# Patient Record
Sex: MALE | Race: OTHER | HISPANIC OR LATINO | URBAN - METROPOLITAN AREA
[De-identification: names, ages, dates, MRNs, and addresses within clinical notes are randomized per-mention and may not be internally consistent; named-entity substitution may affect disease eponyms.]

---

## 2022-10-15 ENCOUNTER — EMERGENCY (EMERGENCY)
Facility: HOSPITAL | Age: 57
LOS: 1 days | Discharge: HOME | End: 2022-10-15
Attending: STUDENT IN AN ORGANIZED HEALTH CARE EDUCATION/TRAINING PROGRAM
Payer: COMMERCIAL

## 2022-10-15 VITALS
HEIGHT: 64.96 IN | RESPIRATION RATE: 18 BRPM | OXYGEN SATURATION: 96 % | TEMPERATURE: 98 F | WEIGHT: 179.9 LBS | HEART RATE: 64 BPM | DIASTOLIC BLOOD PRESSURE: 84 MMHG | SYSTOLIC BLOOD PRESSURE: 143 MMHG

## 2022-10-15 PROCEDURE — 93005 ELECTROCARDIOGRAM TRACING: CPT

## 2022-10-15 PROCEDURE — 73120 X-RAY EXAM OF HAND: CPT | Mod: 26,LT

## 2022-10-15 PROCEDURE — 93010 ELECTROCARDIOGRAM REPORT: CPT

## 2022-10-15 PROCEDURE — 73110 X-RAY EXAM OF WRIST: CPT | Mod: 26,LT

## 2022-10-15 PROCEDURE — 73110 X-RAY EXAM OF WRIST: CPT

## 2022-10-15 PROCEDURE — 73120 X-RAY EXAM OF HAND: CPT

## 2022-10-15 PROCEDURE — 99284 EMERGENCY DEPT VISIT MOD MDM: CPT

## 2022-10-15 PROCEDURE — 99284 EMERGENCY DEPT VISIT MOD MDM: CPT | Mod: 25

## 2022-10-15 RX ORDER — DIAZEPAM 5 MG
1 TABLET ORAL
Qty: 9 | Refills: 0
Start: 2022-10-15 | End: 2022-10-17

## 2022-10-15 RX ORDER — LIDOCAINE 4 G/100G
1 CREAM TOPICAL ONCE
Refills: 0 | Status: COMPLETED | OUTPATIENT
Start: 2022-10-15 | End: 2022-10-15

## 2022-10-15 RX ORDER — IBUPROFEN 200 MG
600 TABLET ORAL ONCE
Refills: 0 | Status: COMPLETED | OUTPATIENT
Start: 2022-10-15 | End: 2022-10-15

## 2022-10-15 RX ORDER — DIAZEPAM 5 MG
10 TABLET ORAL ONCE
Refills: 0 | Status: DISCONTINUED | OUTPATIENT
Start: 2022-10-15 | End: 2022-10-15

## 2022-10-15 RX ORDER — IBUPROFEN 200 MG
1 TABLET ORAL
Qty: 56 | Refills: 0
Start: 2022-10-15 | End: 2022-10-28

## 2022-10-15 RX ADMIN — Medication 600 MILLIGRAM(S): at 12:01

## 2022-10-15 RX ADMIN — Medication 600 MILLIGRAM(S): at 13:00

## 2022-10-15 RX ADMIN — Medication 10 MILLIGRAM(S): at 12:01

## 2022-10-15 RX ADMIN — LIDOCAINE 1 PATCH: 4 CREAM TOPICAL at 12:01

## 2022-10-15 NOTE — ED PROVIDER NOTE - OBJECTIVE STATEMENT
58yo M pmh nephrolithiasis presenting to ED with 2 wks of L hand and arm pain/cramping after sustaining hand trauma at work. Pt is , was using a tool when his hand slipped and he slammed it on a metal bar. Since then has difficulty gripping items and as of the past week began developing L shoulder cramping radiating down the arm. No loss of sensation, no recent falls, denies any cuts or further injury.

## 2022-10-15 NOTE — ED PROVIDER NOTE - PHYSICAL EXAMINATION
GENERAL: non-toxic appearing, alert, in NAD  HEENT: atraumatic, normocephalic, Vision grossly intact, no conjunctivitis or discharge, hearing grossly intact,  no nasal discharge, epistaxis   CARDIAC: RRR, normal S1S2,  no appreciable murmurs, no cyanosis, cap refill < 2 seconds  PULM: normal work of breathing, oxygen saturation on RA wnl, CTAB, no crackles, rales, rhonchi, or wheezing  GI: abdomen nondistended, soft, nontender, no guarding or rebound tenderness, no palpable masses  NEURO: awake and alert, follows commands, normal speech, PERRLA, EOMI, no focal motor or sensory deficits  MSK: spine appears normal, no joint swelling or erythema, +tenderness of L scapula with no gross deformities, ranging all extremities with no appreciable loss of ROM  LUE, normal muscle bulk, intact thumb opposition and fine motor movement, no thenar wasting, sensation intact, good cap refil, normal  strength, FROM at wrist and elbow.   EXT: no peripheral edema, calf tenderness, redness or swelling  SKIN: warm, dry, and intact, no rashes  PSYCH: appropriate mood and affect

## 2022-10-15 NOTE — ED PROVIDER NOTE - NSFOLLOWUPINSTRUCTIONS_ED_ALL_ED_FT
You were seen today for hand and wrist pain.    Your back and shoulder muscle spasms were treated with a muscle relaxer, ibuprofen was given for antiinflammatory and pain     Take 400-600mg ibuprofen (advil or motrin) every 6-8 hours as needed, take with food    A prescription for diazepam (valium) was sent to your pharmacy. This medication is a muscle relaxer which can make you sleepy and drowsy. Please take only as needed for muscle spasms    Please follow up with orthopedics or sports medicine, their number is listed in this packet    Please return to the Emergency Department should you experience any of the following:  - New or worsening pain  - Numbness or weakness of your legs or feet  - Fever, unexplained weight loss, night sweats  - Blood in stool or in urine  - New weakness, fatigue, or fainting  - Any new concerning symptoms Le vieron hoy por dolor en la mano y la cedrick.    Los espasmos musculares de la espalda y los hombros se trataron con un relajante muscular, se administró ibuprofeno vick antiinflamatorio y analgésico.    Wood 400-600 mg de ibuprofeno (advil o motrin) cada 6-8 horas según sea necesario, tómelo con alimentos    Se envió jason receta de diazepam (valium) a jarrell farmacia. Nisreen medicamento es un relajante muscular que puede causarle sueño y somnolencia. Wood solo según sea necesario para los espasmos musculares.    Shania un seguimiento con ortopedia o medicina deportiva, jarrell número se encuentra en nisreen paquete    Regrese al Departamento de Emergencias si experimenta alguno de los siguientes:  - Dolor nuevo o que empeora  - Entumecimiento o debilidad de las piernas o los pies  - Fiebre, pérdida de peso inexplicable, sudores nocturnos  - Constantine en las heces o en la orina  - Nueva debilidad, fatiga o desmayo  - Cualquier nuevo síntoma preocupante    ----------------------------------------------------------------------------------------------------------  You were seen today for hand and wrist pain.    Your back and shoulder muscle spasms were treated with a muscle relaxer, ibuprofen was given for antiinflammatory and pain     Take 400-600mg ibuprofen (advil or motrin) every 6-8 hours as needed, take with food    A prescription for diazepam (valium) was sent to your pharmacy. This medication is a muscle relaxer which can make you sleepy and drowsy. Please take only as needed for muscle spasms    Please follow up with orthopedics or sports medicine, their number is listed in this packet    Please return to the Emergency Department should you experience any of the following:  - New or worsening pain  - Numbness or weakness of your legs or feet  - Fever, unexplained weight loss, night sweats  - Blood in stool or in urine  - New weakness, fatigue, or fainting  - Any new concerning symptoms

## 2022-10-15 NOTE — ED ADULT NURSE NOTE - LOCATION
Care Due:                  Date            Visit Type   Department     Provider  --------------------------------------------------------------------------------                                ESTABLISHED                              PATIENT      [facility] FAMILY  Last Visit: 11-      [facility]       OMAR SANTOS  Next Visit: None Scheduled  None         None Found                                                            Last  Test          Frequency    Reason                     Performed    Due Date  --------------------------------------------------------------------------------    Office Visit  12 months..  lisinopriL, tadalafil....  11- 11-    Cr..........  12 months..  lisinopriL...............  11-   10-    K...........  12 months..  lisinopriL...............  11-   10-    Powered by [employer]. Reference number: [id number]. 8/14/2020 8:06:59 PM CDT   back

## 2022-10-15 NOTE — ED PROVIDER NOTE - NSFOLLOWUPCLINICS_GEN_ALL_ED_FT
[facility]  Orthopedics  [address] [place] [address]  Phone: [phone]  Fax: [phone]  Follow Up Time: 7-10 Days

## 2022-10-15 NOTE — ED ADULT TRIAGE NOTE - CHIEF COMPLAINT QUOTE
c/o pain to Left hand to arm ,shoulder and neck area x 5 days . no injury but works as a  . states pain worse on movements

## 2022-10-15 NOTE — ED PROVIDER NOTE - WR INTERPRETATION 1
MSK XR negative - No fracture, No dislocation, No foreign body, possible osteoporosis given loss of bony trabeculae and cortical thinning

## 2022-10-15 NOTE — ED PROVIDER NOTE - CLINICAL SUMMARY MEDICAL DECISION MAKING FREE TEXT BOX
58yo M with 2 wks of L hand cramping and weakness, 1 wk L shoulder cramping. Well appearing with FROM and intact sensation throughout. Tenderness over L scapula. No gross deformities  - suspect L forearm tendinopathy with concurrent muscle strain of L shoulder/upper back  - muscle relaxer, lidocaine patch, advil  - x-ray hand and wrist given recent trauma, low suspicion for fracture.

## 2022-10-15 NOTE — ED PROVIDER NOTE - PATIENT PORTAL LINK FT
You can access the FollowMyHealth Patient Portal offered by [facility] by registering at the following website: [email or web address]. By joining [employer]’s FollowMyHealth portal, you will also be able to view your health information using other applications (apps) compatible with our system.

## 2022-10-17 ENCOUNTER — EMERGENCY (EMERGENCY)
Facility: HOSPITAL | Age: 57
LOS: 1 days | Discharge: HOME | End: 2022-10-17
Attending: EMERGENCY MEDICINE
Payer: COMMERCIAL

## 2022-10-17 VITALS
HEIGHT: 64.96 IN | HEART RATE: 60 BPM | OXYGEN SATURATION: 98 % | WEIGHT: 179.9 LBS | TEMPERATURE: 98 F | RESPIRATION RATE: 18 BRPM | SYSTOLIC BLOOD PRESSURE: 128 MMHG | DIASTOLIC BLOOD PRESSURE: 85 MMHG

## 2022-10-17 PROBLEM — N20.0 CALCULUS OF KIDNEY: Chronic | Status: ACTIVE | Noted: 2022-10-15

## 2022-10-17 PROCEDURE — 73030 X-RAY EXAM OF SHOULDER: CPT | Mod: 26,LT

## 2022-10-17 PROCEDURE — G1004: CPT

## 2022-10-17 PROCEDURE — 99284 EMERGENCY DEPT VISIT MOD MDM: CPT

## 2022-10-17 PROCEDURE — 99284 EMERGENCY DEPT VISIT MOD MDM: CPT | Mod: 25

## 2022-10-17 PROCEDURE — 73030 X-RAY EXAM OF SHOULDER: CPT

## 2022-10-17 PROCEDURE — 72125 CT NECK SPINE W/O DYE: CPT | Mod: 26,ME

## 2022-10-17 PROCEDURE — 96374 THER/PROPH/DIAG INJ IV PUSH: CPT

## 2022-10-17 PROCEDURE — 96372 THER/PROPH/DIAG INJ SC/IM: CPT | Mod: XU

## 2022-10-17 PROCEDURE — 72125 CT NECK SPINE W/O DYE: CPT | Mod: ME

## 2022-10-17 RX ORDER — DIAZEPAM 5 MG
5 TABLET ORAL ONCE
Refills: 0 | Status: DISCONTINUED | OUTPATIENT
Start: 2022-10-17 | End: 2022-10-17

## 2022-10-17 RX ORDER — DEXAMETHASONE 0.5 MG/5ML
6 ELIXIR ORAL ONCE
Refills: 0 | Status: COMPLETED | OUTPATIENT
Start: 2022-10-17 | End: 2022-10-17

## 2022-10-17 RX ORDER — KETOROLAC TROMETHAMINE 30 MG/ML
30 SYRINGE (ML) INJECTION ONCE
Refills: 0 | Status: DISCONTINUED | OUTPATIENT
Start: 2022-10-17 | End: 2022-10-17

## 2022-10-17 RX ORDER — LIDOCAINE 4 G/100G
1 CREAM TOPICAL ONCE
Refills: 0 | Status: COMPLETED | OUTPATIENT
Start: 2022-10-17 | End: 2022-10-17

## 2022-10-17 RX ORDER — METHOCARBAMOL 500 MG/1
2 TABLET, FILM COATED ORAL
Qty: 30 | Refills: 0
Start: 2022-10-17 | End: 2022-10-21

## 2022-10-17 RX ADMIN — Medication 5 MILLIGRAM(S): at 16:04

## 2022-10-17 RX ADMIN — Medication 30 MILLIGRAM(S): at 16:40

## 2022-10-17 RX ADMIN — Medication 6 MILLIGRAM(S): at 16:03

## 2022-10-17 RX ADMIN — Medication 30 MILLIGRAM(S): at 16:04

## 2022-10-17 RX ADMIN — LIDOCAINE 1 PATCH: 4 CREAM TOPICAL at 16:03

## 2022-10-17 NOTE — ED ADULT NURSE NOTE - OBJECTIVE STATEMENT
pt  is a 56 y/o  male with c/o  lt. upper  back pain radiating  to the rt  arm.   w/  equal chest  expansion with  clear breath  sound  pt  denies any chest pain and no signs of any  shortness of breath.

## 2022-10-17 NOTE — ED PROVIDER NOTE - NS ED ROS FT
Pt denies fevers, chills  nausea, vomiting,   chest pain, palpitations  shortness of breath, orthopnea  abdominal pain, melena,   dysuria, hematuria   saddle anesthesia   rash  enlarged lymph nodes

## 2022-10-17 NOTE — ED PROVIDER NOTE - OBJECTIVE STATEMENT
57-year-old male with left shoulder pain left wrist and hand pain.  Patient states he has a decreased  and numbness in the medial aspect of his hand.  He denies any recent trauma.  He came here a few days ago had x-rays done but has feels no relief since that time.  He does feel electric shooting pain down his arm.  He denies any trauma to his neck.  He denies any urinary incontinence saddle anesthesia leg pain leg weakness.

## 2022-10-17 NOTE — ED PROVIDER NOTE - PATIENT PORTAL LINK FT
You can access the FollowMyHealth Patient Portal offered by [facility] by registering at the following website: [email or web address]. By joining [employer]’s FollowMyHealth portal, you will also be able to view your health information using other applications (apps) compatible with our system.

## 2022-10-17 NOTE — ED PROVIDER NOTE - NSFOLLOWUPINSTRUCTIONS_ED_ALL_ED_FT
Radiculopatía cervical    Cervical Radiculopathy    Close-up of the nerves of the cervical spine.   La radiculopatía cervical se presenta cuando un nervio del roel (un nervio cervical) está comprimido o dañado. Esta afección puede ocurrir debido a jason lesión en la columna vertebral cervical (vértebras) del roel, o vick parte del proceso de envejecimiento normal. La compresión de los nervios cervicales puede causar dolor o adormecimiento que se extiende desde el roel hasta el brazo y los dedos de la mano. Esta afección generalmente mejora con reposo. Si no mejora, kyree vez sea necesario administrar un tratamiento.      ¿Cuáles son las causas?    Esta afección puede ser causada por lo siguiente:  •Lesión en el roel.      •Un abombamiento (hernia) discal.      •Espasmos musculares.      •Rigidez de los músculos del roel debido al uso excesivo.      •Artritis.      •Fractura o degeneración de los huesos y las articulaciones de la columna (espondiloartrosis) debido al envejecimiento.      •Espolones óseos que pueden formarse cerca de los nervios cervicales.        ¿Cuáles son los signos o síntomas?    Los síntomas de esta afección incluyen:  •Dolor. El dolor puede extenderse desde el roel hasta el brazo y la mano. El dolor puede ser intenso o molesto. Puede empeorar cuando mueve el roel.      •Adormecimiento u hormigueo en el brazo o la mano.      •Debilidad en el brazo y la mano afectados, en casos graves.        ¿Cómo se diagnostica?    Esta afección se puede diagnosticar en función de los síntomas, la historia clínica y los antecedentes médicos. También pueden hacerle estudios, que incluyen los siguientes:  •Radiografías.      •Exploración por tomografía computarizada (TC).      •Resonancia magnética (RM).      •Electromiograma (EMG).      •Pruebas de conducción nerviosa.        ¿Cómo se trata?    En muchos casos, no se requiere tratamiento para esta afección. Con reposo, esta suele mejorar con el tiempo. Si es necesario administrar tratamiento, las opciones pueden incluir lo siguiente:  •Usar un collarín cervical blando ish períodos cortos.      •Hacer fisioterapia para fortalecer los músculos del roel.      •Usar medicamentos. Estos pueden incluir antiinflamatorios no esteroideos (FLORENCE), vick ibuprofeno, o corticoesteroides orales.      •Aplicarse inyecciones en la columna vertebral, en los casos graves.      •Someterse a jason cirugía. Olinda puede ser necesario si otros tratamientos no son eficaces. Según la causa de esta afección, podrán implementarse diferentes tipos de cirugía.        Siga estas indicaciones en jarrell casa:    Si tiene un collarín cervical:     •Úselo vick se lo haya indicado el médico. Quíteselo solamente vick se lo haya indicado el médico.    •Pregúntele al médico si puede quitarse el collarín cervical para bañarse e higienizarse. Si lo autorizan a quitarse el collarín para bañarse o higienizarse:  •Siga las instrucciones del médico acerca de cómo quitarse el collarín de manera morgan.      •Para limpiar el collarín, pásele un paño con agua y jabón suave, y séquelo nathen.      •Quite las almohadillas desmontables del collarín, si las tiene, cada 1 o 2 días y lávelas a mano con agua y jabón. Déjelas que se sequen por completo antes de volver a ponerlas en el collarín.      •Contrólese la piel debajo del collarín para deonna si hay irritación o llagas. Si presenta alguna de estas, informe a jarrell médico.          Control del dolor       Bag of ice on a towel on the skin.        A heating pad for use on the painful area.     •Use los medicamentos de venta luz elena y los recetados solamente vick se lo haya indicado el médico.    •Si se lo indican, aplique hielo sobre la destinee afectada. Para hacer esto:  •Si tiene un collarín cervical blando, quíteselo vick se lo haya indicado el médico.      •Ponga el hielo en jason bolsa plástica.      •Coloque jason toalla entre la piel y la bolsa.      •Aplique el hielo ish 20 minutos, 2 o 3 veces por día.      •Retire el hielo si la piel se pone de color gongora brillante. Olinda es muy importante. Si no puede sentir dolor, calor o frío, tiene un mayor riesgo de que se dañe la destinee.      •Si aplicarse hielo no le fox el dolor, intente aplicarse calor. Use la aniket de calor que el médico le recomiende, vick jason compresa de calor húmedo o jason almohadilla térmica.  •Coloque jason toalla entre la piel y la aniket de calor.      •Aplique calor ish 20 a 30 minutos.      •Retire la aniket de calor si la piel se pone de color gongora brillante. Olinda es especialmente importante si no puede sentir dolor, calor o frío. Corre un mayor riesgo de sufrir quemaduras.        •Intente darse un masaje suave en el roel y el hombro para ayudar a aliviar los síntomas.      Actividad     •Descanse todo lo que sea necesario.      •Retome zachery actividades normales vick se lo haya indicado el médico. Pregúntele al médico qué actividades son seguras para usted.      •Realice ejercicios de elongación y fortalecimiento vick se lo hayan indicado el médico o el fisioterapeuta.      •Es posible que deba evitar levantar objetos. Pregúntele al médico cuánto peso puede levantar sin correr riesgos.      Indicaciones generales     •Use jason almohada plana para dormir.      • No conduzca mientras usa un collarín cervical. Si no tiene un collarín cervical, pregúntele al médico si es seguro que conduzca ish el proceso de curación del roel.      •Pregúntele al médico si el medicamento recetado le impide conducir o usar maquinaria.      • No consuma ningún producto que contenga nicotina o tabaco. Estos productos incluyen cigarrillos, tabaco para mascar y aparatos de vapeo, vick los cigarrillos electrónicos. Si necesita ayuda para dejar de consumir estos productos, consulte al médico.      •Concurra a todas las visitas de seguimiento. Olinda es importante.        Comuníquese con un médico si:    •La afección no mejora con tratamiento.        Solicite ayuda de inmediato si:    •El dolor se intensifica mucho y no se fox con los medicamentos.      •Siente debilidad o adormecimiento en la mano, el brazo, el krystina o la pierna.      •Tiene fiebre luis.      •Tiene rigidez de roel.      •Pierde el control de la vejiga o los intestinos (tiene incontinencia).      •Tiene dificultad para caminar, mantener el equilibrio o hablar.        Resumen    •La radiculopatía cervical se presenta cuando un nervio del roel está comprimido o dañado.      •Un nervio puede pinzarse por un abultamiento discal, artritis, espasmos musculares o jason lesión en el roel.      •Los síntomas incluyen dolor, hormigueo o adormecimiento que se irradia desde el roel hacia el brazo o la mano. En los casos graves, también puede presentarse debilidad.      •El tratamiento puede incluir reposo, fisioterapia y usar un collarín cervical. Pueden recetarle medicamentos para aliviar el dolor. En casos graves, kyree vez haya que aplicar inyecciones o realizar jason cirugía.      Esta información no tiene vick fin reemplazar el consejo del médico. Asegúrese de hacerle al médico cualquier pregunta que tenga.

## 2022-10-17 NOTE — ED PROVIDER NOTE - NSPTACCESSSVCSAPPTDETAILS_ED_ALL_ED_FT
spine or neuro, whichever you can get a sooner appointment!  thank you  If no appointment in 5-7 days, tell pt to return to ER  thank you

## 2022-10-17 NOTE — ED PROVIDER NOTE - PHYSICAL EXAMINATION
General: Well appearing, no acute distress, appears stated age  HEENT: normocephalic, atraumatic   Respiratory: normal work of breathing  MSK: slightly decreased sensation at medial palm, slightly  decreased  at lateral palm,  moving all extremities spontaneously   Skin: warm, dry  Neuro: A&Ox3, cranial nerves II-XII intact, 5/5 strength in all extremities, no sensory deficits, normal gait   Psych: appropriate affect

## 2022-10-17 NOTE — ED PROVIDER NOTE - CLINICAL SUMMARY MEDICAL DECISION MAKING FREE TEXT BOX
Patient with 2 weeks of hand pain slightly decreased  slightly decreased sensation and electric shooting pain down his arm.  We will give him steroids for the cervical record radiculopathy CTs can to make sure there is no fracture and follow-up with neuro or spine.

## 2022-10-19 ENCOUNTER — APPOINTMENT (OUTPATIENT)
Dept: ORTHOPEDIC SURGERY | Facility: CLINIC | Age: 57
End: 2022-10-19

## 2022-10-19 PROBLEM — Z00.00 ENCOUNTER FOR PREVENTIVE HEALTH EXAMINATION: Status: ACTIVE | Noted: 2022-10-19

## 2024-03-15 NOTE — ED ADULT NURSE NOTE - PERIPHERAL VASCULAR
Wt Readings from Last 3 Encounters:   03/12/24 57 kg (125 lb 10.6 oz)   02/20/24 56.4 kg (124 lb 6.4 oz)   10/31/23 58.2 kg (128 lb 4.8 oz)     Maintained presently off lasix  Continue bisoprolol daily       - - -